# Patient Record
Sex: MALE | Race: WHITE | NOT HISPANIC OR LATINO | Employment: OTHER | URBAN - METROPOLITAN AREA
[De-identification: names, ages, dates, MRNs, and addresses within clinical notes are randomized per-mention and may not be internally consistent; named-entity substitution may affect disease eponyms.]

---

## 2021-09-21 ENCOUNTER — HISTORICAL (OUTPATIENT)
Dept: ADMINISTRATIVE | Facility: HOSPITAL | Age: 56
End: 2021-09-21

## 2021-09-21 LAB
ABS NEUT (OLG): 5.7 X10(3)/MCL (ref 2.1–9.2)
ALBUMIN SERPL-MCNC: 4.1 GM/DL (ref 3.4–5)
ALBUMIN/GLOB SERPL: 1.52 {RATIO} (ref 1.5–2.5)
ALP SERPL-CCNC: 93 UNIT/L (ref 38–126)
ALT SERPL-CCNC: 53 UNIT/L (ref 7–52)
APPEARANCE, UA: CLEAR
AST SERPL-CCNC: 33 UNIT/L (ref 15–37)
BACTERIA #/AREA URNS AUTO: NORMAL /HPF
BILIRUB SERPL-MCNC: 0.3 MG/DL (ref 0.2–1)
BILIRUB UR QL STRIP: NEGATIVE MG/DL
BILIRUBIN DIRECT+TOT PNL SERPL-MCNC: 0.1 MG/DL (ref 0–0.5)
BILIRUBIN DIRECT+TOT PNL SERPL-MCNC: 0.2 MG/DL
BUN SERPL-MCNC: 16 MG/DL (ref 7–18)
CALCIUM SERPL-MCNC: 8.8 MG/DL (ref 8.5–10.1)
CHLORIDE SERPL-SCNC: 105 MMOL/L (ref 98–107)
CHOLEST SERPL-MCNC: 185 MG/DL (ref 0–200)
CHOLEST/HDLC SERPL: 4.4 {RATIO}
CO2 SERPL-SCNC: 28 MMOL/L (ref 21–32)
COLOR UR: YELLOW
CREAT SERPL-MCNC: 0.87 MG/DL (ref 0.6–1.3)
ERYTHROCYTE [DISTWIDTH] IN BLOOD BY AUTOMATED COUNT: 11.7 % (ref 11.5–17)
EST CREAT CLEARANCE SER (OHS): 149.27 ML/MIN
EST. AVERAGE GLUCOSE BLD GHB EST-MCNC: 114 MG/DL
GLOBULIN SER-MCNC: 2.7 GM/DL (ref 1.2–3)
GLUCOSE (UA): NEGATIVE MG/DL
GLUCOSE SERPL-MCNC: 128 MG/DL (ref 74–106)
HBA1C MFR BLD: 5.6 % (ref 4.4–6.4)
HCT VFR BLD AUTO: 41.8 % (ref 42–52)
HDLC SERPL-MCNC: 42 MG/DL (ref 35–60)
HGB BLD-MCNC: 14.2 GM/DL (ref 14–18)
HGB UR QL STRIP: NEGATIVE UNIT/L
KETONES UR QL STRIP: NEGATIVE MG/DL
LDLC SERPL CALC-MCNC: 116 MG/DL (ref 0–129)
LEUKOCYTE ESTERASE UR QL STRIP: NEGATIVE UNIT/L
LYMPHOCYTES # BLD AUTO: 1.9 X10(3)/MCL (ref 0.6–3.4)
LYMPHOCYTES NFR BLD AUTO: 23 % (ref 13–40)
MCH RBC QN AUTO: 31.3 PG (ref 27–31.2)
MCHC RBC AUTO-ENTMCNC: 34 GM/DL (ref 32–36)
MCV RBC AUTO: 92 FL (ref 80–94)
MONOCYTES # BLD AUTO: 0.8 X10(3)/MCL (ref 0.1–1.3)
MONOCYTES NFR BLD AUTO: 9.3 % (ref 0.1–24)
NEUTROPHILS NFR BLD AUTO: 67.7 % (ref 47–80)
NITRITE UR QL STRIP.AUTO: NEGATIVE
PH UR STRIP: 6 [PH]
PLATELET # BLD AUTO: 295 X10(3)/MCL (ref 130–400)
PMV BLD AUTO: 8.8 FL (ref 9.4–12.4)
POTASSIUM SERPL-SCNC: 4.5 MMOL/L (ref 3.5–5.1)
PROT SERPL-MCNC: 6.8 GM/DL (ref 6.4–8.2)
PROT UR QL STRIP: NEGATIVE MG/DL
PSA SERPL-MCNC: 1.73 NG/ML (ref 0–3.5)
RBC # BLD AUTO: 4.54 X10(6)/MCL (ref 4.7–6.1)
RBC #/AREA URNS HPF: NORMAL /HPF
SODIUM SERPL-SCNC: 139 MMOL/L (ref 136–145)
SP GR UR STRIP: 1.02
SQUAMOUS EPITHELIAL, UA: NORMAL /LPF
TRIGL SERPL-MCNC: 112 MG/DL (ref 30–150)
TSH SERPL-ACNC: 0.73 MIU/ML (ref 0.35–4.94)
UROBILINOGEN UR STRIP-ACNC: 0.2 MG/DL
VLDLC SERPL CALC-MCNC: 22.4 MG/DL
WBC # SPEC AUTO: 8.4 X10(3)/MCL (ref 4.5–11.5)
WBC #/AREA URNS AUTO: NORMAL /[HPF]

## 2021-10-06 ENCOUNTER — HISTORICAL (OUTPATIENT)
Dept: ADMINISTRATIVE | Facility: HOSPITAL | Age: 56
End: 2021-10-06

## 2022-04-07 ENCOUNTER — HISTORICAL (OUTPATIENT)
Dept: ADMINISTRATIVE | Facility: HOSPITAL | Age: 57
End: 2022-04-07

## 2022-04-24 VITALS
SYSTOLIC BLOOD PRESSURE: 134 MMHG | WEIGHT: 242.5 LBS | OXYGEN SATURATION: 100 % | BODY MASS INDEX: 33.95 KG/M2 | HEIGHT: 71 IN | DIASTOLIC BLOOD PRESSURE: 84 MMHG

## 2022-05-01 NOTE — HISTORICAL [facility]
This is a historical note converted from Sophia. Formatting and pictures may have been removed.  Please reference Sophia for original formatting and attached multimedia. Chief Complaint  CPX/FASTING  History of Present Illness  Patient is here today for wellness CPX.? He is tolerating all medications without side effects. ?His blood pressure remains controlled.? He does have a history of smoking?30+ pack years?but did quit?5 years ago.? We will set him up for low-dose CAT scan of the lung since he is now 55.? He has not yet received?his Covid vaccination?but is considering getting it.? I encouraged him to do so.? No new complaints.  Review of Systems  GENERAL:?no? unexplained wt ?gain?8lbs , no fever, fatigue, chills, rare night sweats or ?weakness  HEENT: no?? sore throat, ?ear pain, ?sinus pressure, ?+ nasal congestion, or ?+ clear rhinorrhea, +AR  VISION:?no ?vision changes, ?glaucoma, cataracts, +reading glasses  CARDIAC: no? chest pain,??palpitations,?Dyspnea on exertion, ?orthopnea  RESPIRATORY:?no??cough,?wheezing, sputum production or?SOB-- no covid vaccine --considering  GI: no???abdominal pain, n&v,?constipation, diarrhea,??blood in stool or_?no family history of colon cancer?_  :?no? dysuria, ?hematuria, + ?frequency, urgency, ?incontinence,? testicular pain/swelling,?_no ?family history of prostate cancer_  [facility]:? no? myalgia, ?weakness, edema,? arthralgia, or ?joint effusion  SKIN:? No?rash, hives,?itching or?sores--hx basal cell  NEURO:? No?headaches, numbness, tingling,? weakness, or ?dizziness  PSYCH:? No anxiety, depression, ?irritability, ?suicidal ideation or hallucinations  ENDO:? No ?polyuria, ?polydipsia, ?polyphagia  HEME:? No Bruising, lymphadenopathy, bleeding disorders ?or?signs of anemia  Physical Exam  Vitals & Measurements  HR:?64(Peripheral)? BP:?134/84?  HT:?180.30?cm? WT:?110.000?kg? BMI:?33.84?  GENERAL: NAD, alert and oriented x 3  SKIN:? no rash or abnormal appearing skin  lesions  HEENT:? PERRLA, EOMI, mouth wnl, throat wnl, EAC and TM wnl bilaterally  NECK:? FROM, no lymphadenopathy, no thyroid abnormalities palpable  CHEST:? CTA bilaterally no wheezes, crackles or rubs  CARDIAC:? RRR, no murmurs audible  ABDOMEN:? Soft, nontender, nondistended, NBSx4,?no rebound or guarding, no HSM  EXTREMITIES:? no clubbing, cyanosis, or edema.? joints wnl. +2 DP/PT pulse bilaterally  NEURO:? no sensory or motor deficits noted. CN II-XII intact. Gait wnl.?  GENITAL: normal testes, no hernia  RECTAL: no hemorrhoids or fissures, prostate WNL  Assessment/Plan  1.?Wellness examination?Z00.00  ?CBC, CMP, FLP, PSA, U/A, TSH ?colonoscopy 12/2017 due 12/2022, ?low dose CT of lung, Encourage pt to increase cardiovascular exercise and attempt to obtain at least 150 minutes of moderate aerobic exercise per week or 75 minutes of vigorous aerobic exercise weekly.  Ordered:  CBC w/ Auto Diff, Routine collect, 09/21/21 8:05:00 CDT, Blood, Order for future visit, Stop date 09/21/21 8:05:00 CDT, Lab Collect, Wellness examination, 09/21/21 8:05:00 CDT  Clinic Follow-Up Wellness, *Est. 09/21/22 3:00:00 CDT, Order for future visit, Wellness examination  HTN (hypertension)  Hypercholesterolemia  ED (erectile dysfunction), [facility] AFP  Comprehensive Metabolic Panel, Routine collect, 09/21/21 8:05:00 CDT, Blood, Order for future visit, Stop date 09/21/21 8:05:00 CDT, Lab Collect, Wellness examination  HTN (hypertension)  Hypercholesterolemia, 09/21/21 8:05:00 CDT  Lab Collection Request, 09/21/21 8:05:00 CDT, [facility] AMB - AFP, 09/21/21 8:05:00 CDT, Wellness examination  Lipid Panel, Routine collect, 09/21/21 8:05:00 CDT, Blood, Order for future visit, Stop date 09/21/21 8:05:00 CDT, Lab Collect, Wellness examination  Hypercholesterolemia, 09/21/21 8:05:00 CDT  Preventative Health Care Est 40-64 years [id number] PC, Wellness examination  HTN (hypertension)  Hypercholesterolemia  ED (erectile dysfunction), [facility] AMB -  AFP, 09/21/21 8:07:00 CDT  Schedule Diagnostics Study, low dose CT of lung, [facility] program, 09/21/21 8:15:00 CDT, Wellness examination  Thyroid Stimulating Hormone, Routine collect, 09/21/21 8:12:00 CDT, Blood, Order for future visit, Stop date 09/21/21 8:12:00 CDT, Lab Collect, Wellness examination  HTN (hypertension)  Hypercholesterolemia, 09/21/21 8:12:00 CDT  Urinalysis no Reflex, Routine collect, Urine, Order for future visit, 09/21/21 8:05:00 CDT, Stop date 09/21/21 8:05:00 CDT, Nurse collect, Wellness examination  HTN (hypertension)  ?  2.?Screening for prostate cancer?Z12.5  ?PSA  Ordered:  Prostate Specific Antigen, Routine collect, 09/21/21 8:05:00 CDT, Blood, Order for future visit, Stop date 09/21/21 8:05:00 CDT, Lab Collect, Screening for prostate cancer, 09/21/21 8:05:00 CDT  ?  3.?Encounter for immunization?Z23  ?flu blok, shingrix, consider covid vaccine  Ordered:  Influenza Virus Vaccine, Inactivated, 0.5 mL, IM, Once, first dose 09/21/21 8:05:00 CDT, stop date 09/21/21 8:05:00 CDT  ?  4.?HTN (hypertension)?I10  ?continue Valsartan 160 mg  Ordered:  Clinic Follow-Up Wellness, *Est. 09/21/22 3:00:00 CDT, Order for future visit, Wellness examination  HTN (hypertension)  Hypercholesterolemia  ED (erectile dysfunction), [facility] AFP  Comprehensive Metabolic Panel, Routine collect, 09/21/21 8:05:00 CDT, Blood, Order for future visit, Stop date 09/21/21 8:05:00 CDT, Lab Collect, Wellness examination  HTN (hypertension)  Hypercholesterolemia, 09/21/21 8:05:00 CDT  Preventative Health Care Est 40-64 years [id number] PC, Wellness examination  HTN (hypertension)  Hypercholesterolemia  ED (erectile dysfunction), [facility] AMB - AFP, 09/21/21 8:07:00 CDT  Thyroid Stimulating Hormone, Routine collect, 09/21/21 8:12:00 CDT, Blood, Order for future visit, Stop date 09/21/21 8:12:00 CDT, Lab Collect, Wellness examination  HTN (hypertension)  Hypercholesterolemia, 09/21/21 8:12:00 CDT  Urinalysis no Reflex, Routine  collect, Urine, Order for future visit, 09/21/21 8:05:00 CDT, Stop date 09/21/21 8:05:00 CDT, Nurse collect, Wellness examination  HTN (hypertension)  ?  5.?Hypercholesterolemia?E78.00  ?continue crestor 10 mg  Ordered:  Clinic Follow-Up Wellness, *Est. 09/21/22 3:00:00 CDT, Order for future visit, Wellness examination  HTN (hypertension)  Hypercholesterolemia  ED (erectile dysfunction), [facility] AFP  Comprehensive Metabolic Panel, Routine collect, 09/21/21 8:05:00 CDT, Blood, Order for future visit, Stop date 09/21/21 8:05:00 CDT, Lab Collect, Wellness examination  HTN (hypertension)  Hypercholesterolemia, 09/21/21 8:05:00 CDT  Lipid Panel, Routine collect, 09/21/21 8:05:00 CDT, Blood, Order for future visit, Stop date 09/21/21 8:05:00 CDT, Lab Collect, Wellness examination  Hypercholesterolemia, 09/21/21 8:05:00 CDT  Preventative Health Care Est 40-64 years [id number] PC, Wellness examination  HTN (hypertension)  Hypercholesterolemia  ED (erectile dysfunction), INK AMB - AFP, 09/21/21 8:07:00 CDT  Thyroid Stimulating Hormone, Routine collect, 09/21/21 8:12:00 CDT, Blood, Order for future visit, Stop date 09/21/21 8:12:00 CDT, Lab Collect, Wellness examination  HTN (hypertension)  Hypercholesterolemia, 09/21/21 8:12:00 CDT  ?  6.?ED (erectile dysfunction)?N52.9  ?continue Cialis 20 mg prn  Ordered:  Clinic Follow-Up Wellness, *Est. 09/21/22 3:00:00 CDT, Order for future visit, Wellness examination  HTN (hypertension)  Hypercholesterolemia  ED (erectile dysfunction), [facility]  Preventative Health Care Est 40-64 years [id number] PC, Wellness examination  HTN (hypertension)  Hypercholesterolemia  ED (erectile dysfunction), INK AMB - AFP, 09/21/21 8:07:00 CDT  ?  Referrals  Clinic Follow-Up Wellness, *Est. 09/21/22 3:00:00 CDT, Order for future visit, Wellness examination  HTN (hypertension)  Hypercholesterolemia  ED (erectile dysfunction), ink AFP   Problem List/Past Medical History  Ongoing  Acute  URI  HTN (hypertension)  Hypercholesterolemia  Obesity  Pharyngitis  Wellness examination  Historical  Compression  Erectile dysfunction  HA - Headache  HTN - Hypertension  Hypercholesterolemia  Procedure/Surgical History  Colonoscopy (12/08/2017)  Basal cell skin cancer  Shoulder repair  Vasectomy   Medications  Cialis 20 mg oral tablet, See Instructions, 6 refills  Influenza Virus Vaccine, Inactivated, 0.5 mL, IM, Once  multivitamin with minerals (Adult Tab), 1 tab(s), Oral, Daily  rosuvastatin 10 mg oral tablet, See Instructions  valsartan 160 mg oral tablet, See Instructions  Allergies  codeine  lisinopril?(Cough)  penicillins  Social History  Abuse/Neglect  No, 09/21/2021  No, 09/03/2020  No, 02/11/2020  No, 09/18/2019  No, 08/28/2019  Alcohol  Current, Liquor, Daily, 09/21/2021  Current, Liquor, 3-5 times per week, 09/03/2020  Current, Beer, Liquor, Daily, 08/28/2019  Current, Beer, 1-2 times per week, 08/22/2018  Employment/School  Employed, Previous employment/school: [employer] 05/24/2018  Home/Environment  Lives with Children, Spouse., 05/24/2018  Tobacco  Former smoker, quit more than 30 days ago, No, 09/21/2021  Former smoker, quit more than 30 days ago, N/A, 09/03/2020  Former smoker, quit more than 30 days ago, N/A, 02/11/2020  Former smoker, quit more than 30 days ago, N/A, 09/18/2019  Former smoker, quit more than 30 days ago, N/A, 08/28/2019  Former smoker, N/A, 11/05/2018  Former smoker Use:. Cigarettes Type:. 20 per day., 05/24/2018  Family History  COPD (chronic obstructive pulmonary disease).: Mother.  Diabetes mellitus: Brother.  ETOH - Alcohol intake: Father.  Hypertension.: Brother.  Primary malignant neoplasm of lung: Mother.  Tremor: Mother.  Immunizations  Vaccine Date Status   influenza virus vaccine, inactivated 09/03/2020 Given   influenza virus vaccine, inactivated 10/11/2019 Recorded   pneumococcal 23-polyvalent vaccine 08/22/2018 Given    tetanus/diphtheria/pertussis, acel(Tdap) 05/21/2016 Recorded   tetanus/diphth/pertuss (Tdap) adult/adol 08/27/2015 Recorded   Health Maintenance  Health Maintenance  ???Pending?(in the next year)  ??? ??OverDue  ??? ? ? ?Alcohol Misuse Screening due??01/02/21??and every 1??year(s)  ??? ? ? ?Hypertension Management-BMP due??09/03/21??and every 1??year(s)  ??? ??Due?  ??? ? ? ?Aspirin Therapy for CVD Prevention due??09/02/21??and every 1??year(s)  ??? ? ? ?ADL Screening due??09/21/21??and every 1??year(s)  ??? ? ? ?Depression Screening due??09/21/21??Unknown Frequency  ??? ? ? ?Hypertension Management-Education due??09/21/21??and every 1??year(s)  ??? ? ? ?Zoster Vaccine due??09/21/21??Unknown Frequency  ??? ??Due In Future?  ??? ? ? ?Obesity Screening not due until??01/01/22??and every 1??year(s)  ???Satisfied?(in the past 1 year)  ??? ??Satisfied?  ??? ? ? ?Blood Pressure Screening on??09/21/21.??Satisfied by Chiara Sarkar CMAri L.  ??? ? ? ?Body Mass Index Check on??09/21/21.??Satisfied by Chiara Sarkar CMAri L.  ??? ? ? ?Hypertension Management-Blood Pressure on??09/21/21.??Satisfied by Chiara Sarkar CMAri L.  ??? ? ? ?Influenza Vaccine on??09/21/21.??Satisfied by Gamal Barajas MD  ??? ? ? ?Obesity Screening on??09/21/21.??Satisfied by Chiara Sarkar CMAri L.  ?

## 2022-07-19 PROBLEM — J06.9 ACUTE UPPER RESPIRATORY INFECTION: Status: ACTIVE | Noted: 2022-07-19

## 2022-07-19 PROBLEM — I10 HYPERTENSION: Status: ACTIVE | Noted: 2022-07-19

## 2022-07-19 PROBLEM — J02.9 PHARYNGITIS: Status: ACTIVE | Noted: 2022-07-19

## 2022-07-19 PROBLEM — E78.00 HYPERCHOLESTEROLEMIA: Status: ACTIVE | Noted: 2022-07-19

## 2022-09-27 PROBLEM — Z23 IMMUNIZATION DUE: Status: ACTIVE | Noted: 2022-09-27

## 2022-09-27 PROBLEM — Z87.891 FORMER SMOKER: Status: ACTIVE | Noted: 2022-09-27

## 2022-09-27 PROBLEM — Z12.5 PROSTATE CANCER SCREENING: Status: ACTIVE | Noted: 2022-09-27

## 2022-09-27 PROBLEM — N52.9 ERECTILE DYSFUNCTION: Status: ACTIVE | Noted: 2022-09-27

## 2022-09-27 PROBLEM — Z00.00 ENCOUNTER FOR WELLNESS EXAMINATION IN ADULT: Status: ACTIVE | Noted: 2022-09-27

## 2022-09-28 PROBLEM — H72.92 PERFORATED TYMPANIC MEMBRANE, LEFT: Status: ACTIVE | Noted: 2022-09-28

## 2022-09-28 PROBLEM — E66.01 SEVERE OBESITY (BMI 35.0-39.9) WITH COMORBIDITY: Status: ACTIVE | Noted: 2022-09-28

## 2022-09-28 PROBLEM — Z12.2 SCREENING FOR LUNG CANCER: Status: ACTIVE | Noted: 2022-09-28

## 2022-11-08 ENCOUNTER — HOSPITAL ENCOUNTER (OUTPATIENT)
Dept: RADIOLOGY | Facility: HOSPITAL | Age: 57
Discharge: HOME | End: 2022-11-08
Attending: FAMILY MEDICINE
Payer: COMMERCIAL

## 2022-11-08 PROCEDURE — 71271 CT THORAX LUNG CANCER SCR C-: CPT | Mod: TC

## 2023-01-02 PROBLEM — Z00.00 ENCOUNTER FOR WELLNESS EXAMINATION IN ADULT: Status: RESOLVED | Noted: 2022-09-27 | Resolved: 2023-01-02

## 2023-02-08 PROBLEM — Z01.818 PREOP GENERAL PHYSICAL EXAM: Status: ACTIVE | Noted: 2023-02-08

## 2023-02-09 PROBLEM — R06.09 DYSPNEA ON EXERTION: Status: ACTIVE | Noted: 2023-02-09

## 2023-10-05 PROBLEM — R73.9 HYPERGLYCEMIA: Status: ACTIVE | Noted: 2023-10-05

## 2023-10-05 PROBLEM — H72.92 PERFORATED TYMPANIC MEMBRANE, LEFT: Status: RESOLVED | Noted: 2022-09-28 | Resolved: 2023-10-05

## 2023-10-05 PROBLEM — J06.9 ACUTE UPPER RESPIRATORY INFECTION: Status: RESOLVED | Noted: 2022-07-19 | Resolved: 2023-10-05

## 2023-10-05 PROBLEM — Z01.818 PREOP GENERAL PHYSICAL EXAM: Status: RESOLVED | Noted: 2023-02-08 | Resolved: 2023-10-05

## 2023-10-05 PROBLEM — J02.9 PHARYNGITIS: Status: RESOLVED | Noted: 2022-07-19 | Resolved: 2023-10-05

## 2023-10-06 PROBLEM — T78.40XA ALLERGIES: Status: ACTIVE | Noted: 2023-10-06

## 2023-10-06 PROBLEM — T78.40XA ALLERGIES: Status: RESOLVED | Noted: 2023-10-06 | Resolved: 2023-10-06

## 2023-10-06 PROCEDURE — 87389 HIV-1 AG W/HIV-1&-2 AB AG IA: CPT | Performed by: FAMILY MEDICINE

## 2023-10-06 PROCEDURE — 86803 HEPATITIS C AB TEST: CPT | Performed by: FAMILY MEDICINE

## 2024-01-08 PROBLEM — Z00.00 ENCOUNTER FOR WELLNESS EXAMINATION IN ADULT: Status: RESOLVED | Noted: 2022-09-27 | Resolved: 2024-01-08

## 2024-02-16 ENCOUNTER — OFFICE VISIT (OUTPATIENT)
Dept: URGENT CARE | Facility: CLINIC | Age: 59
End: 2024-02-16
Payer: COMMERCIAL

## 2024-02-16 VITALS
WEIGHT: 240 LBS | OXYGEN SATURATION: 95 % | SYSTOLIC BLOOD PRESSURE: 131 MMHG | HEART RATE: 95 BPM | RESPIRATION RATE: 20 BRPM | HEIGHT: 71 IN | DIASTOLIC BLOOD PRESSURE: 87 MMHG | TEMPERATURE: 98 F | BODY MASS INDEX: 33.6 KG/M2

## 2024-02-16 DIAGNOSIS — J34.89 SINUS DRAINAGE: ICD-10-CM

## 2024-02-16 DIAGNOSIS — J30.9 ALLERGIC RHINITIS, UNSPECIFIED SEASONALITY, UNSPECIFIED TRIGGER: Primary | ICD-10-CM

## 2024-02-16 DIAGNOSIS — R09.82 PND (POST-NASAL DRIP): ICD-10-CM

## 2024-02-16 LAB
CTP QC/QA: YES
MOLECULAR STREP A: NEGATIVE
POC MOLECULAR INFLUENZA A AGN: NEGATIVE
POC MOLECULAR INFLUENZA B AGN: NEGATIVE
SARS-COV-2 RDRP RESP QL NAA+PROBE: NEGATIVE

## 2024-02-16 PROCEDURE — 87635 SARS-COV-2 COVID-19 AMP PRB: CPT | Mod: QW,,,

## 2024-02-16 PROCEDURE — 87502 INFLUENZA DNA AMP PROBE: CPT | Mod: QW,,,

## 2024-02-16 PROCEDURE — 87651 STREP A DNA AMP PROBE: CPT | Mod: QW,,,

## 2024-02-16 PROCEDURE — 99214 OFFICE O/P EST MOD 30 MIN: CPT | Mod: ,,,

## 2024-02-16 RX ORDER — AZELASTINE 1 MG/ML
1 SPRAY, METERED NASAL 2 TIMES DAILY
Qty: 30 ML | Refills: 0 | Status: SHIPPED | OUTPATIENT
Start: 2024-02-16 | End: 2024-02-16

## 2024-02-16 RX ORDER — AZELASTINE 1 MG/ML
1 SPRAY, METERED NASAL 2 TIMES DAILY
Qty: 30 ML | Refills: 0 | Status: SHIPPED | OUTPATIENT
Start: 2024-02-16 | End: 2025-02-15

## 2024-02-16 NOTE — PATIENT INSTRUCTIONS
Negative flu, strep, COVID, discussed other viral etiology versus allergies  Drink plenty of fluids. Get plenty of rest.   Claritin, Zyrtec, or other over-the-counter antihistamine for runny nose, postnasal drip, nasal congestion.  Nasal spray such as Nasacort or Flonase for congestion.  Over-the-counter cough medication as needed and as directed.  Warm saltwater gargles for sore throat.  Warm water with honey to help coat the throat.  Throat lozenges.  Chloraseptic spray for worsening sore throat.  Tylenol or ibuprofen as needed for sore throat and fever.  May alternate every 3 hours    Call or return to clinic as needed   Go to the ER with any significant change or worsening of symptoms.   Follow up with your primary care doctor.

## 2024-02-16 NOTE — PROGRESS NOTES
"Subjective:      Patient ID: Jesse Espinal is a 58 y.o. male.    Vitals:  height is 5' 11" (1.803 m) and weight is 108.9 kg (240 lb). His temperature is 98.3 °F (36.8 °C). His blood pressure is 131/87 and his pulse is 95. His respiration is 20 and oxygen saturation is 95%.     Chief Complaint: Sinus Problem (Runny nose, watery eyes started day before yesterday)     Patient is a 58-year-old male with past medical history of hypercholesterolemia, hypertension who presents to urgent care clinic with complaints of   Rhinorrhea, watery eyes, some nasal congestion that began over the last 1-2 days.   Does describe mild dry throat upon awakening this morning. Associated relief with nasal spray and allergy medicine. He reports his  2-year-old granddaughter is at its house and he wanted to be swabbed.  Patient denies fever, body aches, chills, neck stiffness, rash, GI symptoms, shortness  of breath.  He reports overall he feels as if he has allergies but he wanted to be safe.      ROS   Objective:     Physical Exam   Constitutional: He is oriented to person, place, and time. He appears well-developed. He is cooperative.  Non-toxic appearance. He does not appear ill. No distress.   HENT:   Head: Normocephalic and atraumatic.   Ears:   Right Ear: Hearing, tympanic membrane, external ear and ear canal normal.   Left Ear: Hearing, tympanic membrane, external ear and ear canal normal.   Nose: Rhinorrhea and congestion present. No mucosal edema or nasal deformity. No epistaxis. Right sinus exhibits no maxillary sinus tenderness and no frontal sinus tenderness. Left sinus exhibits no maxillary sinus tenderness and no frontal sinus tenderness.      Comments:   Mild rhinorrhea, nasal congestion  Mouth/Throat: Uvula is midline, oropharynx is clear and moist and mucous membranes are normal. Mucous membranes are moist. No trismus in the jaw. Normal dentition. No uvula swelling. No oropharyngeal exudate, posterior oropharyngeal edema or " posterior oropharyngeal erythema.      Comments: Clear postnasal drip, cobblestoning  Eyes: Conjunctivae and lids are normal. No scleral icterus.   Neck: Trachea normal and phonation normal. Neck supple. No edema present. No erythema present. No neck rigidity present.   Cardiovascular: Normal rate, regular rhythm, normal heart sounds and normal pulses.   Pulmonary/Chest: Effort normal and breath sounds normal. No respiratory distress. He has no decreased breath sounds. He has no rhonchi.   Abdominal: Normal appearance.   Musculoskeletal: Normal range of motion.         General: No deformity. Normal range of motion.   Lymphadenopathy:     He has no cervical adenopathy.   Neurological: He is alert and oriented to person, place, and time. He exhibits normal muscle tone. Coordination normal.   Skin: Skin is warm, dry, intact, not diaphoretic and not pale.   Psychiatric: His speech is normal and behavior is normal. Judgment and thought content normal.   Nursing note and vitals reviewed.      Assessment:     1. Allergic rhinitis, unspecified seasonality, unspecified trigger    2. PND (post-nasal drip)    3. Sinus drainage        Plan:       Allergic rhinitis, unspecified seasonality, unspecified trigger  -     POCT Strep A, Molecular  -     POCT Influenza A/B MOLECULAR  -     POCT COVID-19 Rapid Screening  -     azelastine (ASTELIN) 137 mcg (0.1 %) nasal spray; 1 spray (137 mcg total) by Nasal route 2 (two) times daily.  Dispense: 30 mL; Refill: 0    PND (post-nasal drip)    Sinus drainage                Discussed possible steroid use, as patient reports feels as if he is having allergy symptoms will treat with antihistamines, nasal sprays and call back if needed.      Negative flu, strep, COVID, discussed other viral etiology versus allergies  Drink plenty of fluids. Get plenty of rest.   Claritin, Zyrtec, or other over-the-counter antihistamine for runny nose, postnasal drip, nasal congestion.  Nasal spray such as  Nasacort or Flonase for congestion.  Over-the-counter cough medication as needed and as directed.  Warm saltwater gargles for sore throat.  Warm water with honey to help coat the throat.  Throat lozenges.  Chloraseptic spray for worsening sore throat.  Tylenol or ibuprofen as needed for sore throat and fever.  May alternate every 3 hours    Call or return to clinic as needed   Go to the ER with any significant change or worsening of symptoms.   Follow up with your primary care doctor.

## 2024-10-14 PROBLEM — Z00.00 ENCOUNTER FOR WELLNESS EXAMINATION IN ADULT: Status: RESOLVED | Noted: 2022-09-27 | Resolved: 2024-10-14

## 2024-10-14 PROBLEM — R73.03 PREDIABETES: Status: ACTIVE | Noted: 2023-10-05

## 2024-11-11 ENCOUNTER — OFFICE VISIT (OUTPATIENT)
Dept: URGENT CARE | Facility: CLINIC | Age: 59
End: 2024-11-11
Payer: COMMERCIAL

## 2024-11-11 VITALS
HEART RATE: 102 BPM | TEMPERATURE: 98 F | BODY MASS INDEX: 33.32 KG/M2 | DIASTOLIC BLOOD PRESSURE: 81 MMHG | SYSTOLIC BLOOD PRESSURE: 127 MMHG | RESPIRATION RATE: 17 BRPM | WEIGHT: 238 LBS | HEIGHT: 71 IN | OXYGEN SATURATION: 98 %

## 2024-11-11 DIAGNOSIS — S22.31XA CLOSED FRACTURE OF ONE RIB OF RIGHT SIDE, INITIAL ENCOUNTER: ICD-10-CM

## 2024-11-11 DIAGNOSIS — R07.81 RIB PAIN: Primary | ICD-10-CM

## 2024-11-11 PROCEDURE — 99213 OFFICE O/P EST LOW 20 MIN: CPT | Mod: ,,,

## 2024-11-11 RX ORDER — TRAMADOL HYDROCHLORIDE 50 MG/1
50 TABLET ORAL EVERY 8 HOURS PRN
Qty: 9 TABLET | Refills: 0 | Status: SHIPPED | OUTPATIENT
Start: 2024-11-11 | End: 2024-11-14

## 2024-11-11 NOTE — PROGRESS NOTES
"Subjective:      Patient ID: Jesse Espinal is a 59 y.o. male.    Vitals:  height is 5' 11" (1.803 m) and weight is 108 kg (238 lb). His temperature is 98.2 °F (36.8 °C). His blood pressure is 127/81 and his pulse is 102. His respiration is 17 and oxygen saturation is 98%.     Chief Complaint: Fall     Patient is a 59 y.o. male who presents to urgent care with complaints right rib pain x 2 weeks. He reports that he slipped while walking two weeks ago and has had pain with movement and inspiration since. Patient denies fever, wheezing, sob, cough, n/v/d, or cp. He is not on blood thinners, he did not hit his head or have any LOC after the fall.     Fall  Pertinent negatives include no fever.     Constitution: Negative for chills and fever.   HENT: Negative.     Neck: neck negative.   Cardiovascular: Negative.    Eyes: Negative.    Respiratory:  Negative for cough, shortness of breath and wheezing.    Musculoskeletal:  Positive for pain.      Objective:     Physical Exam   Constitutional: He is oriented to person, place, and time. He appears well-developed. He is cooperative.  Non-toxic appearance. He does not appear ill. No distress.   HENT:   Head: Normocephalic and atraumatic.   Ears:   Right Ear: Hearing and external ear normal.   Left Ear: Hearing and external ear normal.   Mouth/Throat: Mucous membranes are normal. Mucous membranes are moist. Oropharynx is clear.   Eyes: Conjunctivae and lids are normal.   Neck: Trachea normal and phonation normal. Neck supple. No edema present. No erythema present. No neck rigidity present.   Cardiovascular: Normal rate, regular rhythm and normal heart sounds.   Pulmonary/Chest: Effort normal and breath sounds normal. No stridor. No respiratory distress. He has no decreased breath sounds. He has no wheezes. He has no rhonchi. He has no rales. He exhibits tenderness (tenderness under the right breast to palpation; no bruising noted). He exhibits no crepitus, no edema, no " deformity and no swelling.   Abdominal: Normal appearance.   Neurological: He is alert and oriented to person, place, and time. He exhibits normal muscle tone.   Skin: Skin is warm, intact and not diaphoretic. Capillary refill takes less than 2 seconds.   Psychiatric: His speech is normal and behavior is normal. Mood normal.   Nursing note and vitals reviewed.      Assessment:     1. Rib pain        Plan:       Rib pain  -     XR RIB RIGHT W/ PA CHEST    Other orders  -     traMADoL (ULTRAM) 50 mg tablet; Take 1 tablet (50 mg total) by mouth every 8 (eight) hours as needed for Pain.  Dispense: 9 tablet; Refill: 0    Possible fracture noted at 6th rib on the right, we will call with any changes in the official report   Splint the area with a pillow when coughing or sneezing   Take deep breaths every hour to encourage lung expansion   Tramadol for severe pain, caution as it may cause sedation do not drink or drive while taking it   Alternate tylenol and motrin for discomfort   If you develop fever, cough, worsening pain or shortness of breath seek care immediately

## 2024-11-11 NOTE — PATIENT INSTRUCTIONS
Possible fracture noted at 6th rib on the right, we will call with any changes in the official report   Splint the area with a pillow when coughing or sneezing   Take deep breaths every hour to encourage lung expansion   Tramadol for severe pain, caution as it may cause sedation do not drink or drive while taking it   Alternate tylenol and motrin for discomfort   If you develop fever, cough, worsening pain or shortness of breath seek care immediately

## 2025-01-09 ENCOUNTER — OFFICE VISIT (OUTPATIENT)
Dept: URGENT CARE | Facility: CLINIC | Age: 60
End: 2025-01-09
Payer: COMMERCIAL

## 2025-01-09 VITALS
HEART RATE: 101 BPM | RESPIRATION RATE: 18 BRPM | DIASTOLIC BLOOD PRESSURE: 83 MMHG | OXYGEN SATURATION: 97 % | HEIGHT: 71 IN | SYSTOLIC BLOOD PRESSURE: 130 MMHG | BODY MASS INDEX: 33.32 KG/M2 | TEMPERATURE: 98 F | WEIGHT: 238 LBS

## 2025-01-09 DIAGNOSIS — R09.89 RUNNY NOSE: ICD-10-CM

## 2025-01-09 DIAGNOSIS — J06.9 ACUTE URI: Primary | ICD-10-CM

## 2025-01-09 LAB
CTP QC/QA: YES
POC MOLECULAR INFLUENZA A AGN: NEGATIVE
POC MOLECULAR INFLUENZA B AGN: NEGATIVE

## 2025-01-09 RX ORDER — BETAMETHASONE SODIUM PHOSPHATE AND BETAMETHASONE ACETATE 3; 3 MG/ML; MG/ML
9 INJECTION, SUSPENSION INTRA-ARTICULAR; INTRALESIONAL; INTRAMUSCULAR; SOFT TISSUE
Status: COMPLETED | OUTPATIENT
Start: 2025-01-09 | End: 2025-01-09

## 2025-01-09 RX ORDER — PROMETHAZINE HYDROCHLORIDE AND DEXTROMETHORPHAN HYDROBROMIDE 6.25; 15 MG/5ML; MG/5ML
5 SYRUP ORAL EVERY 6 HOURS PRN
Qty: 118 ML | Refills: 0 | Status: SHIPPED | OUTPATIENT
Start: 2025-01-09 | End: 2025-01-19

## 2025-01-09 RX ORDER — PROMETHAZINE HYDROCHLORIDE AND DEXTROMETHORPHAN HYDROBROMIDE 6.25; 15 MG/5ML; MG/5ML
5 SYRUP ORAL EVERY 6 HOURS PRN
Qty: 118 ML | Refills: 0 | Status: SHIPPED | OUTPATIENT
Start: 2025-01-09 | End: 2025-01-09

## 2025-01-09 RX ADMIN — BETAMETHASONE SODIUM PHOSPHATE AND BETAMETHASONE ACETATE 9 MG: 3; 3 INJECTION, SUSPENSION INTRA-ARTICULAR; INTRALESIONAL; INTRAMUSCULAR; SOFT TISSUE at 09:01

## 2025-01-09 NOTE — PROGRESS NOTES
"Subjective:      Patient ID: Jesse Espinal is a 59 y.o. male.    Vitals:  height is 5' 11" (1.803 m) and weight is 108 kg (238 lb). His oral temperature is 98.3 °F (36.8 °C). His blood pressure is 130/83 and his pulse is 101. His respiration is 18 and oxygen saturation is 97%.     Chief Complaint: Sinus Problem     Patient is a 59 y.o. male who presents to urgent care with complaints of cough and nasal drip x 3 days. Alleviating factors include OTC Allergy medication with mild amount of relief. Patient denies BA, fever, sob, or gi sx.     ROS   Objective:     Physical Exam   Constitutional: He is oriented to person, place, and time. He appears well-developed. He is cooperative.  Non-toxic appearance. He does not appear ill. No distress.   HENT:   Head: Normocephalic and atraumatic.   Ears:   Right Ear: Hearing, tympanic membrane, external ear and ear canal normal.   Left Ear: Hearing, tympanic membrane, external ear and ear canal normal.   Nose: Nose normal. No nasal deformity. No epistaxis.   Mouth/Throat: Uvula is midline, oropharynx is clear and moist and mucous membranes are normal. No trismus in the jaw. Normal dentition. No uvula swelling. No oropharyngeal exudate, posterior oropharyngeal edema or posterior oropharyngeal erythema.   Eyes: Conjunctivae and lids are normal. No scleral icterus.   Neck: Trachea normal and phonation normal. Neck supple. No edema present. No erythema present. No neck rigidity present.   Cardiovascular: Normal rate, regular rhythm, normal heart sounds and normal pulses.   Pulmonary/Chest: Effort normal and breath sounds normal. No respiratory distress. He has no decreased breath sounds. He has no rhonchi.   Abdominal: Normal appearance.   Musculoskeletal: Normal range of motion.         General: No deformity. Normal range of motion.   Neurological: He is alert and oriented to person, place, and time. He exhibits normal muscle tone. Coordination normal.   Skin: Skin is warm, dry, " intact, not diaphoretic and not pale.   Psychiatric: His speech is normal and behavior is normal. Judgment and thought content normal.   Nursing note and vitals reviewed.       Previous History      Review of patient's allergies indicates:   Allergen Reactions    Codeine     Lisinopril      Other reaction(s): Cough    Penicillins        Past Medical History:   Diagnosis Date    Headache     HTN (hypertension)     Hypercholesterolemia     Male erectile dysfunction, unspecified      Current Outpatient Medications   Medication Instructions    amLODIPine (NORVASC) 5 mg, Oral, Daily    multivitamin (THERAGRAN) per tablet 1 tablet, Daily    promethazine-dextromethorphan (PROMETHAZINE-DM) 6.25-15 mg/5 mL Syrp 5 mLs, Oral, Every 6 hours PRN    rosuvastatin (CRESTOR) 20 mg, Oral, Daily    tadalafiL (CIALIS) 20 mg, Oral, Daily, Q 72 hours prn    valsartan (DIOVAN) 160 mg, Oral, Daily     Past Surgical History:   Procedure Laterality Date    BASAL CELL CARCINOMA EXCISION      nose    COLONOSCOPY W/ BIOPSIES AND POLYPECTOMY  2017    repeat 5 years, Dr. Fowler    COLONOSCOPY W/ POLYPECTOMY N/A 2022    Dr Cruzito Duffy--due 5 years    HERNIA REPAIR      A child    inguinla hernia  1972    SHOULDER SURGERY Right     VASECTOMY       Family History   Problem Relation Name Age of Onset    Lung cancer Mother Kerline 76    Tremor Mother Kerline     COPD Mother Kerline     Heart attack Father Don 72    Alcohol abuse Father Don     Diabetes Brother Vega     Hypertension Brother Vega     Kidney cancer Brother  51       Social History     Tobacco Use    Smoking status: Former     Current packs/day: 0.00     Types: Cigarettes     Start date: 1986     Quit date: 2016     Years since quittin.0     Passive exposure: Never    Smokeless tobacco: Never   Substance Use Topics    Alcohol use: Yes     Alcohol/week: 21.0 standard drinks of alcohol     Types: 21 Drinks containing 0.5 oz of alcohol per week    Drug use: Never         "Physical Exam      Vital Signs Reviewed   /83 (Patient Position: Sitting)   Pulse 101   Temp 98.3 °F (36.8 °C) (Oral)   Resp 18   Ht 5' 11" (1.803 m)   Wt 108 kg (238 lb)   SpO2 97%   BMI 33.19 kg/m²        Procedures    Procedures     Labs     Results for orders placed or performed in visit on 01/09/25   POCT Influenza A/B MOLECULAR    Collection Time: 01/09/25  9:00 AM   Result Value Ref Range    POC Molecular Influenza A Ag Negative Negative    POC Molecular Influenza B Ag Negative Negative     Acceptable Yes        Assessment:     1. Acute URI    2. Runny nose        Plan:       Acute URI    Runny nose  -     POCT Influenza A/B MOLECULAR    Other orders  -     betamethasone acetate-betamethasone sodium phosphate injection 9 mg  -     promethazine-dextromethorphan (PROMETHAZINE-DM) 6.25-15 mg/5 mL Syrp; Take 5 mLs by mouth every 6 (six) hours as needed.  Dispense: 118 mL; Refill: 0    Drink plenty of fluids.     Get plenty of rest.     Tylenol or Motrin as needed.     Go to the ER with any significant change or worsening of symptoms.     Follow up with your primary care doctor.                   "